# Patient Record
Sex: FEMALE | Race: WHITE
[De-identification: names, ages, dates, MRNs, and addresses within clinical notes are randomized per-mention and may not be internally consistent; named-entity substitution may affect disease eponyms.]

---

## 2018-07-30 ENCOUNTER — HOSPITAL ENCOUNTER (OUTPATIENT)
Dept: HOSPITAL 47 - BARWHC3 | Age: 43
Discharge: HOME | End: 2018-07-30
Attending: SURGERY
Payer: COMMERCIAL

## 2018-07-30 VITALS
SYSTOLIC BLOOD PRESSURE: 133 MMHG | HEART RATE: 88 BPM | RESPIRATION RATE: 14 BRPM | TEMPERATURE: 98.2 F | DIASTOLIC BLOOD PRESSURE: 77 MMHG

## 2018-07-30 VITALS — BODY MASS INDEX: 42.4 KG/M2

## 2018-07-30 DIAGNOSIS — Z98.890: ICD-10-CM

## 2018-07-30 DIAGNOSIS — F90.1: ICD-10-CM

## 2018-07-30 DIAGNOSIS — K21.9: ICD-10-CM

## 2018-07-30 DIAGNOSIS — Z46.51: Primary | ICD-10-CM

## 2018-07-30 DIAGNOSIS — Z90.89: ICD-10-CM

## 2018-07-30 DIAGNOSIS — Z96.652: ICD-10-CM

## 2018-07-30 DIAGNOSIS — F17.200: ICD-10-CM

## 2018-07-30 DIAGNOSIS — F32.9: ICD-10-CM

## 2018-07-30 DIAGNOSIS — Z79.899: ICD-10-CM

## 2018-07-30 DIAGNOSIS — Z98.84: ICD-10-CM

## 2018-07-30 DIAGNOSIS — F41.9: ICD-10-CM

## 2018-07-30 LAB
ALBUMIN SERPL-MCNC: 4.1 G/DL (ref 3.5–5)
ALP SERPL-CCNC: 88 U/L (ref 38–126)
ALT SERPL-CCNC: 40 U/L (ref 9–52)
ANION GAP SERPL CALC-SCNC: 9 MMOL/L
AST SERPL-CCNC: 23 U/L (ref 14–36)
BUN SERPL-SCNC: 9 MG/DL (ref 7–17)
CALCIUM SPEC-MCNC: 9.9 MG/DL (ref 8.4–10.2)
CHLORIDE SERPL-SCNC: 104 MMOL/L (ref 98–107)
CO2 SERPL-SCNC: 26 MMOL/L (ref 22–30)
ERYTHROCYTE [DISTWIDTH] IN BLOOD BY AUTOMATED COUNT: 5.17 M/UL (ref 3.8–5.4)
ERYTHROCYTE [DISTWIDTH] IN BLOOD: 15.4 % (ref 11.5–15.5)
GLUCOSE SERPL-MCNC: 83 MG/DL (ref 74–99)
HCT VFR BLD AUTO: 39.2 % (ref 34–46)
HGB BLD-MCNC: 12.2 GM/DL (ref 11.4–16)
MCH RBC QN AUTO: 23.6 PG (ref 25–35)
MCHC RBC AUTO-ENTMCNC: 31 G/DL (ref 31–37)
MCV RBC AUTO: 75.9 FL (ref 80–100)
PLATELET # BLD AUTO: 405 K/UL (ref 150–450)
POTASSIUM SERPL-SCNC: 4.3 MMOL/L (ref 3.5–5.1)
PROT SERPL-MCNC: 6.8 G/DL (ref 6.3–8.2)
SODIUM SERPL-SCNC: 139 MMOL/L (ref 137–145)
WBC # BLD AUTO: 9.9 K/UL (ref 3.8–10.6)

## 2018-07-30 PROCEDURE — 82607 VITAMIN B-12: CPT

## 2018-07-30 PROCEDURE — 83550 IRON BINDING TEST: CPT

## 2018-07-30 PROCEDURE — 36415 COLL VENOUS BLD VENIPUNCTURE: CPT

## 2018-07-30 PROCEDURE — 84134 ASSAY OF PREALBUMIN: CPT

## 2018-07-30 PROCEDURE — 99202 OFFICE O/P NEW SF 15 MIN: CPT

## 2018-07-30 PROCEDURE — 82306 VITAMIN D 25 HYDROXY: CPT

## 2018-07-30 PROCEDURE — 80053 COMPREHEN METABOLIC PANEL: CPT

## 2018-07-30 PROCEDURE — 84443 ASSAY THYROID STIM HORMONE: CPT

## 2018-07-30 PROCEDURE — 84425 ASSAY OF VITAMIN B-1: CPT

## 2018-07-30 PROCEDURE — 84590 ASSAY OF VITAMIN A: CPT

## 2018-07-30 PROCEDURE — 85027 COMPLETE CBC AUTOMATED: CPT

## 2018-07-30 PROCEDURE — 82746 ASSAY OF FOLIC ACID SERUM: CPT

## 2018-07-30 PROCEDURE — 83540 ASSAY OF IRON: CPT

## 2018-07-30 NOTE — P.HPBAR
Bariatric H&P





- History & Physicial


H&P Date: 07/30/18


History & Physicial: 


Visit/CC: band adjustment





Patient initial contact: 





Initial weight: 113.398 kg


Initial weight in pounds: 250.00


Height: 5 ft 4 in


Initial BMI: 42.9





Last weight: 





Current weight: 112.037 kg


Current weight in pounds: 247.00


Current BMI: 42.4





Ideal body weight (based on NIH guidelines): 54.431 kg





Excess body weight loss: 2.3%








The patient is a 42 year-old F who presents for Bariatric Assessment.  Patient 

presents today for lab band follow.  She's had issues with dysphagia and GERD 

for several years.














Past Medical History


Past Medical History: Neurologic Disorder


Additional Past Medical History / Comment(s): Fibromyalgia, migraine headaches, 

hx of anemia requiring iron transfusions,


History of Any Multi-Drug Resistant Organisms: None Reported


Past Surgical History: Appendectomy, Bariatric Surgery, Orthopedic Surgery


Additional Past Surgical History / Comment(s): ACL/knee reconstruction Left 

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 

panniculectomy 2006


Past Anesthesia/Blood Transfusion Reactions: Postoperative Nausea & Vomiting (

PONV)


Additional Past Anesthesia/Blood Transfusion Reaction / Comm: NO blood 

transfusions to date


Past Psychological History: ADD/ADHD, Anxiety, Depression


Additional Psychological History / Comment(s): Takes cymbalta, abilify,  buspar

, ativan, adderall


Smoking Status: Current every day smoker


Past Alcohol Use History: None Reported


Additional Past Alcohol Use History / Comment(s): 1 pack/day x 14 years


Past Drug Use History: None Reported





Surgical - Exam


 Vital Signs











Temp Pulse Resp BP


 


 98.2 F   88   14   133/77 


 


 07/30/18 14:22  07/30/18 14:22  07/30/18 14:22  07/30/18 14:22














- General


well developed, no distress





- Eyes


PERRL





- ENT


normal pinna





- Neck


no masses





- Respiratory


normal expansion





- Cardiovascular


Rhythm: regular





- Abdomen


Abdomen: soft, non tender





Bariatric Assessment & Plan


Plan: 





The patient's lap band was adjusted.  5 mL remove her band.  She currently is 0 

the band.  She will follow-up in 4 weeks.





Bariatric Checklist


Checklist: 


Plan: 





Checklist: 





EGD: 


1. Hiatal hernia: 


2. H. Pylori: 





HgbA1c: 





Vitamin D: 





Smoking: Current every day smoker





Primary care physician referral: nila Rivas ([address] [facility])





Psychiatry clearance: 





Cardiology clearance: 





Sleep study: 





Diet journal: 





VTE risk score: 





VTE risk level: 





Rehab needs at discharge:

## 2018-07-31 LAB — VIT B12 SERPL-MCNC: 727 PG/ML (ref 200–944)

## 2018-10-01 ENCOUNTER — HOSPITAL ENCOUNTER (OUTPATIENT)
Dept: HOSPITAL 47 - BARWHC3 | Age: 43
Discharge: HOME | End: 2018-10-01
Attending: SURGERY
Payer: COMMERCIAL

## 2018-10-01 VITALS — BODY MASS INDEX: 45.2 KG/M2

## 2018-10-01 VITALS — DIASTOLIC BLOOD PRESSURE: 72 MMHG | SYSTOLIC BLOOD PRESSURE: 131 MMHG | TEMPERATURE: 99.2 F | HEART RATE: 98 BPM

## 2018-10-01 DIAGNOSIS — Z46.51: Primary | ICD-10-CM

## 2018-10-01 DIAGNOSIS — Z98.84: ICD-10-CM

## 2018-10-01 DIAGNOSIS — Z90.89: ICD-10-CM

## 2018-10-01 DIAGNOSIS — F41.9: ICD-10-CM

## 2018-10-01 DIAGNOSIS — T73.0XXA: ICD-10-CM

## 2018-10-01 DIAGNOSIS — Z98.890: ICD-10-CM

## 2018-10-01 DIAGNOSIS — Z96.652: ICD-10-CM

## 2018-10-01 DIAGNOSIS — F32.9: ICD-10-CM

## 2018-10-01 DIAGNOSIS — F17.210: ICD-10-CM

## 2018-10-01 DIAGNOSIS — F90.9: ICD-10-CM

## 2018-10-01 PROCEDURE — 99212 OFFICE O/P EST SF 10 MIN: CPT

## 2018-10-01 NOTE — P.HPBAR
Bariatric H&P





- History & Physicial


H&P Date: 10/01/18


History & Physicial: 


Visit/CC: band fill





Patient initial contact: 





Initial weight: 113.398 kg


Initial weight in pounds: 250.00


Height: 5 ft 4 in


Initial BMI: 42.9





Last weight: 





Current weight: 119.522 kg


Current weight in pounds: 263.50


Current BMI: 45.2





Ideal body weight (based on NIH guidelines): 54.431 kg





Excess body weight loss: 








The patient is a 43 year-old F who presents for Bariatric Assessment.  She 

presents for lap band adjustment.  She is currently hungry and is requesting a 

fill of her LAP-BAND.














Past Medical History


Past Medical History: Neurologic Disorder


Additional Past Medical History / Comment(s): Fibromyalgia, migraine headaches, 

hx of anemia requiring iron transfusions,


History of Any Multi-Drug Resistant Organisms: None Reported


Past Surgical History: Appendectomy, Bariatric Surgery, Orthopedic Surgery


Additional Past Surgical History / Comment(s): ACL/knee reconstruction Left 

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 

panniculectomy 2006


Past Anesthesia/Blood Transfusion Reactions: Postoperative Nausea & Vomiting (

PONV)


Additional Past Anesthesia/Blood Transfusion Reaction / Comm: NO blood 

transfusions to date


Past Psychological History: ADD/ADHD, Anxiety, Depression


Additional Psychological History / Comment(s): Takes cymbalta, abilify,  buspar

, ativan, adderall


Smoking Status: Current every day smoker


Past Alcohol Use History: None Reported


Additional Past Alcohol Use History / Comment(s): 1 pack/day x 14 years


Past Drug Use History: None Reported





Surgical - Exam


 Vital Signs











Temp Pulse BP


 


 99.2 F   98   131/72 


 


 10/01/18 14:31  10/01/18 14:31  10/01/18 14:31














- General


well developed, no distress





- Abdomen


Abdomen: soft, tender





Bariatric Assessment & Plan


Plan: 





Patient LAP-BAND was adjusted.  She has 3 mL added to her band.  She'll follow-

up in 4 weeks.





Bariatric Checklist


Checklist: 


Plan: 





Checklist: 





EGD: 


1. Hiatal hernia: 


2. H. Pylori: 





HgbA1c: 





Vitamin D: 





Smoking: Current every day smoker





Primary care physician referral: nila Rivas ([address] [facility])





Psychiatry clearance: 





Cardiology clearance: 





Sleep study: 





Diet journal: 





VTE risk score: 





VTE risk level: 





Rehab needs at discharge: